# Patient Record
Sex: FEMALE | Race: WHITE | NOT HISPANIC OR LATINO | URBAN - METROPOLITAN AREA
[De-identification: names, ages, dates, MRNs, and addresses within clinical notes are randomized per-mention and may not be internally consistent; named-entity substitution may affect disease eponyms.]

---

## 2023-05-22 ENCOUNTER — TELEPHONE (OUTPATIENT)
Dept: PSYCHIATRY | Facility: CLINIC | Age: 23
End: 2023-05-22

## 2023-05-22 NOTE — TELEPHONE ENCOUNTER
"Behavioral Health Outpatient Intake Questions    Referred By   : self    Please advise interviewee that they need to answer all questions truthfully to allow for best care, and any misrepresentations of information may affect their ability to be seen at this clinic   => Was this discussed? Yes     If Minor Child (under age 25)    Who is/are the legal guardian(s) of the child? Is there a custody agreement? No     • If \"YES\"- Custody orders must be obtained prior to scheduling the first appointment  • In addition, Consent to Treatment must be signed by all legal guardians prior to scheduling the first appointment    • If \"NO\"- Consent to Treatment must be signed by all legal guardians prior to scheduling the first appointment    [address] History -     Presenting Problem (in patient's own words): BPD, PTSD    Are there any communication barriers for this patient? No                                               If yes, please describe barriers: None  • If there is a unique situation, please refer to [facility] for final determination  Are you taking any psychiatric medications? No   •   If \"YES\" -What are they none   •   If \"YES\" -Who prescribes? Has the Patient previously received outpatient Talk Therapy or Medication Management from [place] [address]  No     •    If \"YES\"- When, Where and with Whom? •    If \"NO\" -Has Patient received these services elsewhere? •   If \"YES\" -When, Where, and with Whom? [address] [facility]     Has the Patient abused alcohol or other substances in the last 6 months ? Yes  There are suspicions of methamphetamine abuse reported by the patient  •  If \"YES\" -What substance, How much, How often? 1 time a week, clean for 1 week    •  If illegal substance: [facility] ([facility]) or [place]    •  If Alcohol in excess of 10 drinks per week:  Refer to [facility] (for CIRA) or [address] [facility] [address] History-     Is " "this treatment court ordered? No   If \"yes \"send to :  • Talk Therapy : Send to [facility] for final determination   • Med Management: Send to Dr Niya Guaman for final determination     Has the Patient been convicted of a felony? No   If \"Yes\" send to -When, What? • Talk Therapy : Send to [facility] for final determination   • Med Management: Send to Dr Niya Guaman for final determination     ACCEPTED as a patient Yes  • If \"Yes\" Appointment Date: 5/31/2023 @ 1:00 PM with Judith Perez    Referred Elsewhere? No  • If “Yes” - (Where? Ex: [facility], Psychiatric[facility], [address] [place] [address], etc )       Name of Insurance Co: Monserrat Juarez22 ID# [id number]  [id number] Phone #   If ins is primary or secondary? Primary  If patient is a minor, parents information such as Name, D  O B of guarantor    "

## 2023-05-31 ENCOUNTER — SOCIAL WORK (OUTPATIENT)
Dept: BEHAVIORAL/MENTAL HEALTH CLINIC | Facility: CLINIC | Age: 23
End: 2023-05-31

## 2023-05-31 DIAGNOSIS — F33.2 MAJOR DEPRESSIVE DISORDER, RECURRENT SEVERE WITHOUT PSYCHOTIC FEATURES (HCC): ICD-10-CM

## 2023-05-31 DIAGNOSIS — F60.3 BORDERLINE PERSONALITY DISORDER (HCC): Primary | ICD-10-CM

## 2023-05-31 DIAGNOSIS — F41.1 GAD (GENERALIZED ANXIETY DISORDER): ICD-10-CM
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
## 2023-06-01 ENCOUNTER — TELEPHONE (OUTPATIENT)
Dept: PSYCHIATRY | Facility: CLINIC | Age: 23
End: 2023-06-01

## 2023-06-01 NOTE — TELEPHONE ENCOUNTER
Patient signed SARA for   [facility] [address]  [phone]

## 2023-06-14 ENCOUNTER — SOCIAL WORK (OUTPATIENT)
Dept: BEHAVIORAL/MENTAL HEALTH CLINIC | Facility: CLINIC | Age: 23
End: 2023-06-14
Payer: COMMERCIAL

## 2023-06-14 ENCOUNTER — TELEPHONE (OUTPATIENT)
Dept: BEHAVIORAL/MENTAL HEALTH CLINIC | Facility: CLINIC | Age: 23
End: 2023-06-14

## 2023-06-14 DIAGNOSIS — F60.3 BORDERLINE PERSONALITY DISORDER (HCC): Primary | ICD-10-CM

## 2023-06-14 PROCEDURE — 90834 PSYTX W PT 45 MINUTES: CPT | Performed by: SOCIAL WORKER
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
## 2023-06-14 NOTE — BH TREATMENT PLAN
[facility]  2000     Date of Initial Psychotherapy Assessment: 5-  Date of Current Treatment Plan: 06/14/23  Treatment Plan Target Date: 12-  Treatment Plan Expiration Date: 12-    Diagnosis:   1  Borderline personality disorder ([address] )            Area(s) of Need: Pt reports that she need to learn to act and use her coping skills, she needs to communicate when she needs help, avoid panicking, avoid seeing things from all or nothing thinking  Pt reports that she needs to maintain sobriety, address feeling fatigued, stressed, have longer times that she is stabled and successful in recovery  Long Term Goal 1 (in the client's own words): Pt reports that she needs to continue maintain sobriety and need a stronger support system, structure in her life so that she can be effective in her life with taking care of her son  Stage of Change: Contemplation    Target Date for completion: 12-     Anticipated therapeutic modalities: Pt will look into a partial program for mental health treatment, possible IOP program that will help pt to recovery from addiction and mental health discomfort  Pt will attend meetings 2x a week to stablize herself so that she is productive and healthy to take care of her son  Pt will obtain a peer specialist to support her in her sobriety  People identified to complete this goal: Pt      Objective 1: (identify the means of measuring success in meeting the objective): Pt reports that she needs identify a stronger support system to stabilize herself in recovery  Intervention: Therapist will help pt to figure out if she wants drug and alcohol treatment so that she is more stable and equipped to also address mental health symptoms  Therapist will help pt to see the importance of working on goals that are obtainable and realistic so that she feels better able to handle life on life terms   Therapist and pt will continue to identify coping mechanisms that will aid her in long-term sobriety ongoing  Therapist and pt will continue to build a therapeutic rapport so that she feels adequate to regulate her emotions continuously  Therapist will refer pt to a peer specialist to help pt to maintain a support system that is conducive for her recovery  I am currently under the care of a [facility] psychiatric provider: no    My [facility] psychiatric provider is: N/A    I am currently taking psychiatric medications: No    I feel that I will be ready for discharge from mental health care when I reach the following (measurable goal/objective):   Pt reports that she needs to have a solid understanding of mental health diagnosis, managing trauma therapy  For children and adults who have a legal guardian:   Has there been any change to custody orders and/or guardianship status? NA  If yes, attach updated documentation  I have created my Crisis Plan and have been offered a copy of this plan    [address]: Diagnosis and Treatment Plan explained to Jasmineandrew Jenn acknowledges an understanding of their diagnosis  Jim Gresham agrees to this treatment plan      I have been offered a copy of this Treatment Plan  yes

## 2023-06-28 ENCOUNTER — SOCIAL WORK (OUTPATIENT)
Dept: BEHAVIORAL/MENTAL HEALTH CLINIC | Facility: CLINIC | Age: 23
End: 2023-06-28
Payer: COMMERCIAL

## 2023-06-28 DIAGNOSIS — F60.3 BORDERLINE PERSONALITY DISORDER (HCC): Primary | ICD-10-CM

## 2023-06-28 PROCEDURE — 90832 PSYTX W PT 30 MINUTES: CPT | Performed by: SOCIAL WORKER

## 2023-06-28 NOTE — PSYCH
"Behavioral Health Psychotherapy Progress Note    Psychotherapy Provided: Individual Psychotherapy     1  Borderline personality disorder ([address] )            Goals addressed in session: Goal 1     DATA: Pt came into session with her 11year old son, and shared that she may need more support then bi-weekly sessions because of her inability to maintain sobriety  Pt reported that her P O came to her house yesterday and asked her if she was clean, pt said no and was not able to go to the bathroom  Pt reported that she will be drug tested tomorrow or Friday and will be positive for meth and marijuana  Pt reported that she went to the bathroom and washed off the \"p cup\" and the P O  said she will have re take the p test  Pt reported that she did a IV use for meth, cut down on drinking and smoking marijuana  Pt reported that she is getting connected to a Peer Specialist,  for additonal support  During this session, this clinician used the following therapeutic modalities: Cognitive Behavioral Therapy    Substance Abuse was addressed during this session  If the client is diagnosed with a co-occurring substance use disorder, please indicate any changes in the frequency or amount of use: meth Stage of change for addressing substance use diagnoses: Contemplation stage of change    ASSESSMENT:  Yudi Butler presents with a Euthymic/ normal and Anxious mood  her affect is Normal range and intensity and Blunted, which is congruent, with her mood and the content of the session  The client has not made progress on their goals  Yudi Butler presents with a none risk of suicide, none risk of self-harm, and none risk of harm to others  For any risk assessment that surpasses a \"low\" rating, a safety plan must be developed  A safety plan was indicated: no  If yes, describe in detail     PLAN: Between sessions, Yudi Butler will attend a meeting at [facility] in [facility], [address] [facility] [place]   At the next session, the therapist will use " Solution-Focused Therapy to address urges to use, coping skills to deal with cravings, understand the importance of utilize support system  Therapist asked pt to consider Drug and Alcohol treatment because she is struggling with maintaining sobriety and her P O  reported that she needs more sessions then bi-weekly  Behavioral Health Treatment Plan and Discharge Planning: Lanre Mcclelland is aware of and agrees to continue to work on their treatment plan  They have identified and are working toward their discharge goals   yes    Visit start and stop times:    06/28/23  Start Time: 1419  Stop Time: 3960  Total Visit Time: 38 minutes

## 2023-06-29 ENCOUNTER — TELEPHONE (OUTPATIENT)
Dept: PSYCHIATRY | Facility: CLINIC | Age: 23
End: 2023-06-29

## 2023-10-05 ENCOUNTER — DOCUMENTATION (OUTPATIENT)
Dept: BEHAVIORAL/MENTAL HEALTH CLINIC | Facility: CLINIC | Age: 23
End: 2023-10-05

## 2023-10-05 NOTE — PROGRESS NOTES
Psychotherapy Discharge Summary    Preferred Name: Kendell Diego  YOB: 2000    Admission date to psychotherapy: 05-  Referred by: SELF    Presenting Problem: Boderline Personality Disorder, DYLAN, MDD, recurrent severe without psychotic features. Course of treatment included : individual therapy     Progress/Outcome of Treatment Goals (brief summary of course of treatment) pt was only seen briefly due to struggle with addiction issues. Pt asked to be transferred over to drug and alcohol after a few session with this writer. Treatment Complications (if any): pt appeared to be in the contemplation stage of change, needed more help with drug and alcohol rather than mental health treatment. Treatment Progress: fair    Current [facility] Psychiatric Provider: N/A    Discharge Medications include: N/A    Discharge Date: 10-5-2023    Discharge Diagnosis: F41.1 F60.3 F33.2  Criteria for Discharge: need to be transferred to another service/level of care within the system    Aftercare recommendations include (include specific referral names and phone numbers, if appropriate): PT was referred to drug and alcohol treatment at [facility].     Prognosis: fair

## 2024-09-08 ENCOUNTER — APPOINTMENT (EMERGENCY)
Dept: ULTRASOUND IMAGING | Facility: HOSPITAL | Age: 24
End: 2024-09-08
Payer: COMMERCIAL

## 2024-09-08 ENCOUNTER — ANESTHESIA EVENT (EMERGENCY)
Dept: PERIOP | Facility: HOSPITAL | Age: 24
End: 2024-09-08
Payer: COMMERCIAL

## 2024-09-08 ENCOUNTER — ANESTHESIA (EMERGENCY)
Dept: PERIOP | Facility: HOSPITAL | Age: 24
End: 2024-09-08
Payer: COMMERCIAL

## 2024-09-08 ENCOUNTER — HOSPITAL ENCOUNTER (OUTPATIENT)
Facility: HOSPITAL | Age: 24
Setting detail: OUTPATIENT SURGERY
Discharge: HOME | End: 2024-09-09
Attending: EMERGENCY MEDICINE | Admitting: EMERGENCY MEDICINE
Payer: COMMERCIAL

## 2024-09-08 VITALS
BODY MASS INDEX: 29.99 KG/M2 | SYSTOLIC BLOOD PRESSURE: 105 MMHG | OXYGEN SATURATION: 97 % | HEIGHT: 65 IN | RESPIRATION RATE: 18 BRPM | DIASTOLIC BLOOD PRESSURE: 53 MMHG | WEIGHT: 180 LBS | TEMPERATURE: 97.4 F | HEART RATE: 67 BPM

## 2024-09-08 DIAGNOSIS — N93.9 VAGINAL HEMORRHAGE: Primary | ICD-10-CM

## 2024-09-08 DIAGNOSIS — O03.9 ABORTION: ICD-10-CM

## 2024-09-08 DIAGNOSIS — O03.6: ICD-10-CM

## 2024-09-08 LAB
ABO GROUP BLD: NORMAL
ABO GROUP BLD: NORMAL
ALBUMIN SERPL BCG-MCNC: 3.7 G/DL (ref 3.5–5)
ALP SERPL-CCNC: 61 U/L (ref 34–104)
ALT SERPL W P-5'-P-CCNC: 6 U/L (ref 7–52)
ANION GAP SERPL CALCULATED.3IONS-SCNC: 7 MMOL/L (ref 4–13)
APTT PPP: 29 SECONDS (ref 23–34)
AST SERPL W P-5'-P-CCNC: 13 U/L (ref 13–39)
B-HCG SERPL-ACNC: 7005.3 MIU/ML (ref 0–5)
BASOPHILS # BLD AUTO: 0.07 THOUSANDS/ÂΜL (ref 0–0.1)
BASOPHILS NFR BLD AUTO: 1 % (ref 0–1)
BILIRUB SERPL-MCNC: 0.23 MG/DL (ref 0.2–1)
BLD GP AB SCN SERPL QL: NEGATIVE
BUN SERPL-MCNC: 11 MG/DL (ref 5–25)
CALCIUM SERPL-MCNC: 8.6 MG/DL (ref 8.4–10.2)
CHLORIDE SERPL-SCNC: 106 MMOL/L (ref 96–108)
CO2 SERPL-SCNC: 24 MMOL/L (ref 21–32)
CREAT SERPL-MCNC: 0.56 MG/DL (ref 0.6–1.3)
EOSINOPHIL # BLD AUTO: 0.24 THOUSAND/ÂΜL (ref 0–0.61)
EOSINOPHIL NFR BLD AUTO: 2 % (ref 0–6)
ERYTHROCYTE [DISTWIDTH] IN BLOOD BY AUTOMATED COUNT: 14.4 % (ref 11.6–15.1)
ERYTHROCYTE [DISTWIDTH] IN BLOOD BY AUTOMATED COUNT: 14.6 % (ref 11.6–15.1)
FIBRINOGEN PPP-MCNC: 538 MG/DL (ref 206–523)
GFR SERPL CREATININE-BSD FRML MDRD: 129 ML/MIN/1.73SQ M
GLUCOSE SERPL-MCNC: 99 MG/DL (ref 65–140)
HCT VFR BLD AUTO: 32.4 % (ref 34.8–46.1)
HCT VFR BLD AUTO: 34.5 % (ref 34.8–46.1)
HGB BLD-MCNC: 10.7 G/DL (ref 11.5–15.4)
HGB BLD-MCNC: 11.4 G/DL (ref 11.5–15.4)
IMM GRANULOCYTES # BLD AUTO: 0.11 THOUSAND/UL (ref 0–0.2)
IMM GRANULOCYTES NFR BLD AUTO: 1 % (ref 0–2)
INR PPP: 1.08 (ref 0.85–1.19)
LYMPHOCYTES # BLD AUTO: 2.21 THOUSANDS/ÂΜL (ref 0.6–4.47)
LYMPHOCYTES NFR BLD AUTO: 15 % (ref 14–44)
MCH RBC QN AUTO: 28.7 PG (ref 26.8–34.3)
MCH RBC QN AUTO: 29.2 PG (ref 26.8–34.3)
MCHC RBC AUTO-ENTMCNC: 33 G/DL (ref 31.4–37.4)
MCHC RBC AUTO-ENTMCNC: 33 G/DL (ref 31.4–37.4)
MCV RBC AUTO: 87 FL (ref 82–98)
MCV RBC AUTO: 88 FL (ref 82–98)
MONOCYTES # BLD AUTO: 1.25 THOUSAND/ÂΜL (ref 0.17–1.22)
MONOCYTES NFR BLD AUTO: 9 % (ref 4–12)
NEUTROPHILS # BLD AUTO: 10.63 THOUSANDS/ÂΜL (ref 1.85–7.62)
NEUTS SEG NFR BLD AUTO: 72 % (ref 43–75)
NRBC BLD AUTO-RTO: 0 /100 WBCS
PLATELET # BLD AUTO: 259 THOUSANDS/UL (ref 149–390)
PLATELET # BLD AUTO: 281 THOUSANDS/UL (ref 149–390)
PMV BLD AUTO: 10.6 FL (ref 8.9–12.7)
PMV BLD AUTO: 10.6 FL (ref 8.9–12.7)
POTASSIUM SERPL-SCNC: 4 MMOL/L (ref 3.5–5.3)
PROT SERPL-MCNC: 6.3 G/DL (ref 6.4–8.4)
PROTHROMBIN TIME: 14.5 SECONDS (ref 12.3–15)
RBC # BLD AUTO: 3.67 MILLION/UL (ref 3.81–5.12)
RBC # BLD AUTO: 3.97 MILLION/UL (ref 3.81–5.12)
RH BLD: POSITIVE
RH BLD: POSITIVE
SODIUM SERPL-SCNC: 137 MMOL/L (ref 135–147)
SPECIMEN EXPIRATION DATE: NORMAL
WBC # BLD AUTO: 14.51 THOUSAND/UL (ref 4.31–10.16)
WBC # BLD AUTO: 21.22 THOUSAND/UL (ref 4.31–10.16)

## 2024-09-08 PROCEDURE — 85384 FIBRINOGEN ACTIVITY: CPT | Performed by: OBSTETRICS & GYNECOLOGY

## 2024-09-08 PROCEDURE — 76856 US EXAM PELVIC COMPLETE: CPT

## 2024-09-08 PROCEDURE — 76830 TRANSVAGINAL US NON-OB: CPT

## 2024-09-08 PROCEDURE — 86850 RBC ANTIBODY SCREEN: CPT | Performed by: EMERGENCY MEDICINE

## 2024-09-08 PROCEDURE — 85027 COMPLETE CBC AUTOMATED: CPT | Performed by: OBSTETRICS & GYNECOLOGY

## 2024-09-08 PROCEDURE — NC001 PR NO CHARGE: Performed by: OBSTETRICS & GYNECOLOGY

## 2024-09-08 PROCEDURE — 86920 COMPATIBILITY TEST SPIN: CPT

## 2024-09-08 PROCEDURE — 86901 BLOOD TYPING SEROLOGIC RH(D): CPT | Performed by: EMERGENCY MEDICINE

## 2024-09-08 PROCEDURE — 96372 THER/PROPH/DIAG INJ SC/IM: CPT

## 2024-09-08 PROCEDURE — 85730 THROMBOPLASTIN TIME PARTIAL: CPT | Performed by: OBSTETRICS & GYNECOLOGY

## 2024-09-08 PROCEDURE — 99284 EMERGENCY DEPT VISIT MOD MDM: CPT

## 2024-09-08 PROCEDURE — 36415 COLL VENOUS BLD VENIPUNCTURE: CPT | Performed by: EMERGENCY MEDICINE

## 2024-09-08 PROCEDURE — 84702 CHORIONIC GONADOTROPIN TEST: CPT | Performed by: EMERGENCY MEDICINE

## 2024-09-08 PROCEDURE — P9016 RBC LEUKOCYTES REDUCED: HCPCS

## 2024-09-08 PROCEDURE — 96374 THER/PROPH/DIAG INJ IV PUSH: CPT

## 2024-09-08 PROCEDURE — 85610 PROTHROMBIN TIME: CPT | Performed by: OBSTETRICS & GYNECOLOGY

## 2024-09-08 PROCEDURE — 36430 TRANSFUSION BLD/BLD COMPNT: CPT

## 2024-09-08 PROCEDURE — 88305 TISSUE EXAM BY PATHOLOGIST: CPT | Performed by: PATHOLOGY

## 2024-09-08 PROCEDURE — 80053 COMPREHEN METABOLIC PANEL: CPT | Performed by: EMERGENCY MEDICINE

## 2024-09-08 PROCEDURE — 96361 HYDRATE IV INFUSION ADD-ON: CPT

## 2024-09-08 PROCEDURE — 99291 CRITICAL CARE FIRST HOUR: CPT | Performed by: EMERGENCY MEDICINE

## 2024-09-08 PROCEDURE — 96375 TX/PRO/DX INJ NEW DRUG ADDON: CPT

## 2024-09-08 PROCEDURE — 86900 BLOOD TYPING SEROLOGIC ABO: CPT | Performed by: EMERGENCY MEDICINE

## 2024-09-08 PROCEDURE — 85025 COMPLETE CBC W/AUTO DIFF WBC: CPT | Performed by: EMERGENCY MEDICINE

## 2024-09-08 RX ORDER — ONDANSETRON 2 MG/ML
4 INJECTION INTRAMUSCULAR; INTRAVENOUS ONCE
Status: COMPLETED | OUTPATIENT
Start: 2024-09-08 | End: 2024-09-08

## 2024-09-08 RX ORDER — DEXAMETHASONE SODIUM PHOSPHATE 10 MG/ML
INJECTION, SOLUTION INTRAMUSCULAR; INTRAVENOUS AS NEEDED
Status: DISCONTINUED | OUTPATIENT
Start: 2024-09-08 | End: 2024-09-08

## 2024-09-08 RX ORDER — HYDROMORPHONE HCL/PF 1 MG/ML
0.4 SYRINGE (ML) INJECTION
Status: DISCONTINUED | OUTPATIENT
Start: 2024-09-08 | End: 2024-09-08 | Stop reason: HOSPADM

## 2024-09-08 RX ORDER — FENTANYL CITRATE/PF 50 MCG/ML
25 SYRINGE (ML) INJECTION
Status: DISCONTINUED | OUTPATIENT
Start: 2024-09-08 | End: 2024-09-08 | Stop reason: HOSPADM

## 2024-09-08 RX ORDER — MISOPROSTOL 200 UG/1
1000 TABLET ORAL ONCE
Status: COMPLETED | OUTPATIENT
Start: 2024-09-08 | End: 2024-09-08

## 2024-09-08 RX ORDER — KETOROLAC TROMETHAMINE 30 MG/ML
INJECTION, SOLUTION INTRAMUSCULAR; INTRAVENOUS AS NEEDED
Status: DISCONTINUED | OUTPATIENT
Start: 2024-09-08 | End: 2024-09-08

## 2024-09-08 RX ORDER — ACETAMINOPHEN 325 MG/1
650 TABLET ORAL EVERY 4 HOURS PRN
Status: DISCONTINUED | OUTPATIENT
Start: 2024-09-08 | End: 2024-09-09 | Stop reason: HOSPADM

## 2024-09-08 RX ORDER — SODIUM CHLORIDE, SODIUM LACTATE, POTASSIUM CHLORIDE, CALCIUM CHLORIDE 600; 310; 30; 20 MG/100ML; MG/100ML; MG/100ML; MG/100ML
100 INJECTION, SOLUTION INTRAVENOUS CONTINUOUS
Status: DISCONTINUED | OUTPATIENT
Start: 2024-09-08 | End: 2024-09-09

## 2024-09-08 RX ORDER — KETOROLAC TROMETHAMINE 30 MG/ML
30 INJECTION, SOLUTION INTRAMUSCULAR; INTRAVENOUS EVERY 6 HOURS PRN
Status: DISCONTINUED | OUTPATIENT
Start: 2024-09-08 | End: 2024-09-09 | Stop reason: HOSPADM

## 2024-09-08 RX ORDER — DOXYCYCLINE 100 MG/10ML
200 INJECTION, POWDER, LYOPHILIZED, FOR SOLUTION INTRAVENOUS
Status: DISCONTINUED | OUTPATIENT
Start: 2024-09-08 | End: 2024-09-08 | Stop reason: SDUPTHER

## 2024-09-08 RX ORDER — SUCCINYLCHOLINE/SOD CL,ISO/PF 100 MG/5ML
SYRINGE (ML) INTRAVENOUS AS NEEDED
Status: DISCONTINUED | OUTPATIENT
Start: 2024-09-08 | End: 2024-09-08

## 2024-09-08 RX ORDER — TRANEXAMIC ACID 10 MG/ML
1000 INJECTION, SOLUTION INTRAVENOUS ONCE
Status: COMPLETED | OUTPATIENT
Start: 2024-09-08 | End: 2024-09-08

## 2024-09-08 RX ORDER — ONDANSETRON 2 MG/ML
4 INJECTION INTRAMUSCULAR; INTRAVENOUS ONCE AS NEEDED
Status: DISCONTINUED | OUTPATIENT
Start: 2024-09-08 | End: 2024-09-08 | Stop reason: HOSPADM

## 2024-09-08 RX ORDER — LIDOCAINE HYDROCHLORIDE 10 MG/ML
INJECTION, SOLUTION EPIDURAL; INFILTRATION; INTRACAUDAL; PERINEURAL AS NEEDED
Status: DISCONTINUED | OUTPATIENT
Start: 2024-09-08 | End: 2024-09-08

## 2024-09-08 RX ORDER — KETOROLAC TROMETHAMINE 30 MG/ML
15 INJECTION, SOLUTION INTRAMUSCULAR; INTRAVENOUS ONCE
Status: COMPLETED | OUTPATIENT
Start: 2024-09-08 | End: 2024-09-08

## 2024-09-08 RX ORDER — FENTANYL CITRATE 50 UG/ML
INJECTION, SOLUTION INTRAMUSCULAR; INTRAVENOUS AS NEEDED
Status: DISCONTINUED | OUTPATIENT
Start: 2024-09-08 | End: 2024-09-08

## 2024-09-08 RX ORDER — METHYLERGONOVINE MALEATE 0.2 MG/ML
0.2 INJECTION INTRAVENOUS ONCE
Status: COMPLETED | OUTPATIENT
Start: 2024-09-08 | End: 2024-09-08

## 2024-09-08 RX ORDER — SODIUM CHLORIDE, SODIUM LACTATE, POTASSIUM CHLORIDE, CALCIUM CHLORIDE 600; 310; 30; 20 MG/100ML; MG/100ML; MG/100ML; MG/100ML
INJECTION, SOLUTION INTRAVENOUS CONTINUOUS PRN
Status: DISCONTINUED | OUTPATIENT
Start: 2024-09-08 | End: 2024-09-08

## 2024-09-08 RX ORDER — MISOPROSTOL 200 UG/1
1000 TABLET ORAL ONCE
Status: DISCONTINUED | OUTPATIENT
Start: 2024-09-08 | End: 2024-09-08

## 2024-09-08 RX ORDER — LIDOCAINE HYDROCHLORIDE 10 MG/ML
INJECTION, SOLUTION EPIDURAL; INFILTRATION; INTRACAUDAL; PERINEURAL AS NEEDED
Status: DISCONTINUED | OUTPATIENT
Start: 2024-09-08 | End: 2024-09-08 | Stop reason: HOSPADM

## 2024-09-08 RX ORDER — MIDAZOLAM HYDROCHLORIDE 2 MG/2ML
INJECTION, SOLUTION INTRAMUSCULAR; INTRAVENOUS AS NEEDED
Status: DISCONTINUED | OUTPATIENT
Start: 2024-09-08 | End: 2024-09-08

## 2024-09-08 RX ORDER — ONDANSETRON 2 MG/ML
INJECTION INTRAMUSCULAR; INTRAVENOUS AS NEEDED
Status: DISCONTINUED | OUTPATIENT
Start: 2024-09-08 | End: 2024-09-08

## 2024-09-08 RX ORDER — PROPOFOL 10 MG/ML
INJECTION, EMULSION INTRAVENOUS AS NEEDED
Status: DISCONTINUED | OUTPATIENT
Start: 2024-09-08 | End: 2024-09-08

## 2024-09-08 RX ORDER — MORPHINE SULFATE 4 MG/ML
4 INJECTION, SOLUTION INTRAMUSCULAR; INTRAVENOUS ONCE
Status: DISCONTINUED | OUTPATIENT
Start: 2024-09-08 | End: 2024-09-09 | Stop reason: HOSPADM

## 2024-09-08 RX ADMIN — SODIUM CHLORIDE, SODIUM LACTATE, POTASSIUM CHLORIDE, AND CALCIUM CHLORIDE: .6; .31; .03; .02 INJECTION, SOLUTION INTRAVENOUS at 12:46

## 2024-09-08 RX ADMIN — SODIUM CHLORIDE 500 ML: 0.9 INJECTION, SOLUTION INTRAVENOUS at 10:04

## 2024-09-08 RX ADMIN — FENTANYL CITRATE 100 MCG: 50 INJECTION, SOLUTION INTRAMUSCULAR; INTRAVENOUS at 12:53

## 2024-09-08 RX ADMIN — ONDANSETRON 4 MG: 2 INJECTION INTRAMUSCULAR; INTRAVENOUS at 11:15

## 2024-09-08 RX ADMIN — LIDOCAINE HYDROCHLORIDE 50 MG: 10 INJECTION, SOLUTION EPIDURAL; INFILTRATION; INTRACAUDAL; PERINEURAL at 12:55

## 2024-09-08 RX ADMIN — DEXAMETHASONE SODIUM PHOSPHATE 10 MG: 10 INJECTION, SOLUTION INTRAMUSCULAR; INTRAVENOUS at 13:02

## 2024-09-08 RX ADMIN — PROPOFOL 200 MG: 10 INJECTION, EMULSION INTRAVENOUS at 12:55

## 2024-09-08 RX ADMIN — DOXYCYCLINE 200 MG: 100 INJECTION, POWDER, LYOPHILIZED, FOR SOLUTION INTRAVENOUS at 12:44

## 2024-09-08 RX ADMIN — METHYLERGONOVINE MALEATE 0.2 MG: 0.2 INJECTION INTRAVENOUS at 11:14

## 2024-09-08 RX ADMIN — MISOPROSTOL 1000 MCG: 200 TABLET ORAL at 11:40

## 2024-09-08 RX ADMIN — PROPOFOL 70 MG: 10 INJECTION, EMULSION INTRAVENOUS at 13:23

## 2024-09-08 RX ADMIN — ONDANSETRON 4 MG: 2 INJECTION INTRAMUSCULAR; INTRAVENOUS at 13:12

## 2024-09-08 RX ADMIN — SODIUM CHLORIDE 500 ML: 0.9 INJECTION, SOLUTION INTRAVENOUS at 10:50

## 2024-09-08 RX ADMIN — MIDAZOLAM 2 MG: 1 INJECTION INTRAMUSCULAR; INTRAVENOUS at 12:46

## 2024-09-08 RX ADMIN — Medication 80 MG: at 12:55

## 2024-09-08 RX ADMIN — KETOROLAC TROMETHAMINE 15 MG: 30 INJECTION, SOLUTION INTRAMUSCULAR; INTRAVENOUS at 10:28

## 2024-09-08 RX ADMIN — KETOROLAC TROMETHAMINE 30 MG: 30 INJECTION, SOLUTION INTRAMUSCULAR; INTRAVENOUS at 18:14

## 2024-09-08 RX ADMIN — TRANEXAMIC ACID 1000 MG: 10 INJECTION, SOLUTION INTRAVENOUS at 11:33

## 2024-09-08 RX ADMIN — SODIUM CHLORIDE, SODIUM LACTATE, POTASSIUM CHLORIDE, AND CALCIUM CHLORIDE 100 ML/HR: .6; .31; .03; .02 INJECTION, SOLUTION INTRAVENOUS at 15:53

## 2024-09-08 RX ADMIN — HYDROMORPHONE HYDROCHLORIDE 0.4 MG: 1 INJECTION, SOLUTION INTRAMUSCULAR; INTRAVENOUS; SUBCUTANEOUS at 14:12

## 2024-09-08 RX ADMIN — KETOROLAC TROMETHAMINE 15 MG: 30 INJECTION, SOLUTION INTRAMUSCULAR; INTRAVENOUS at 13:21

## 2024-09-08 NOTE — ANESTHESIA POSTPROCEDURE EVALUATION
Post-Op Assessment Note    CV Status:  Stable  Pain Score: 0    Pain management: adequate       Mental Status:  Awake and sleepy   Hydration Status:  Euvolemic   PONV Controlled:  Controlled   Airway Patency:  Patent  Airway: intubated     Post Op Vitals Reviewed: Yes    No anethesia notable event occurred.    Staff: CRNA               BP   112/62   Temp      Pulse  67   Resp      SpO2   100 4LNC

## 2024-09-08 NOTE — ED PROVIDER NOTES
History  Chief Complaint   Patient presents with    Vaginal Bleeding     C/o heavy vaginal bleeding. Was given  pill on 8/15. Monday was reeval and told incomplete , was given another  pill, approx 9 weeks pregnant at time. Has had intermittent bleeding/cramping since. Was seen at [facility] yest. 0900 today passed large blood clots and has heavy bleeding since. GCS 15 during triage     Patient is a 24-year-old female currently at approximately 9 weeks gestation who received medical  pills on 8/15 and then vaginal  pills last week who presents with heavy vaginal bleeding.  Patient states that for the last week she has been having heavy vaginal bleeding, but today at around 9 AM she started with lemon to orange sized clots with vaginal bleeding that she described as gushing around that. She states that she had a significant amount of cramping at the time of the largest clots, but that the cramping is continuing and significant. States that she is getting a bit lightheaded.        None       Past Medical History:   Diagnosis Date            History reviewed. No pertinent surgical history.    History reviewed. No pertinent family history.  I have reviewed and agree with the history as documented.    E-Cigarette/Vaping     E-Cigarette/Vaping Substances    Nicotine Yes     THC No     CBD No     Flavoring No     Other No     Unknown No      Social History     Tobacco Use    Smoking status: Never    Smokeless tobacco: Never   Substance Use Topics    Alcohol use: Not Currently     Comment: denies x 10 days    Drug use: Not Currently     Types: Methamphetamines     Comment: hx of meth use       Review of Systems   Constitutional:  Negative for chills and fever.   Respiratory:  Negative for shortness of breath.    Cardiovascular:  Negative for chest pain.   Gastrointestinal:  Negative for abdominal pain, nausea and vomiting.   Genitourinary:  Positive for pelvic pain and vaginal  bleeding. Negative for dysuria, flank pain, hematuria, vaginal discharge and vaginal pain.   Musculoskeletal:  Negative for back pain.   Skin:  Positive for pallor.   Neurological:  Positive for light-headedness. Negative for dizziness and syncope.       Physical Exam  Physical Exam  Vitals and nursing note reviewed.   Constitutional:       General: She is not in acute distress.     Appearance: Normal appearance. She is not ill-appearing, toxic-appearing or diaphoretic.   HENT:      Head: Normocephalic and atraumatic.      Mouth/Throat:      Mouth: Mucous membranes are moist.   Eyes:      Conjunctiva/sclera: Conjunctivae normal.      Pupils: Pupils are equal, round, and reactive to light.   Cardiovascular:      Rate and Rhythm: Regular rhythm. Tachycardia present.      Pulses: Normal pulses.      Heart sounds: Normal heart sounds. No murmur heard.  Pulmonary:      Effort: Pulmonary effort is normal. No respiratory distress.      Breath sounds: Normal breath sounds. No stridor. No wheezing, rhonchi or rales.   Chest:      Chest wall: No tenderness.   Abdominal:      General: Bowel sounds are normal. There is no distension.      Palpations: Abdomen is soft.      Tenderness: There is abdominal tenderness in the suprapubic area. There is no right CVA tenderness, left CVA tenderness, guarding or rebound. Negative signs include De La Rosa's sign, Rovsing's sign, McBurney's sign and psoas sign.   Genitourinary:     Comments: Approx 250mL of blood and clots changed on devika on bed. Pelvic exam deferred as OB is coming down for assessment. All equipment at bedside.   Skin:     General: Skin is warm and dry.      Coloration: Skin is pale.   Neurological:      General: No focal deficit present.      Mental Status: She is alert and oriented to person, place, and time. Mental status is at baseline.         Vital Signs  ED Triage Vitals [09/08/24 0959]   Temperature Pulse Respirations Blood Pressure SpO2   98 °F (36.7 °C) 73 18  118/76 99 %      Temp Source Heart Rate Source Patient Position - Orthostatic VS BP Location FiO2 (%)   Temporal Monitor Lying Right arm --      Pain Score       6           Vitals:    09/08/24 1059 09/08/24 1101 09/08/24 1116 09/08/24 1130   BP: 111/55 111/55 106/64 114/63   Pulse:  80 71 67   Patient Position - Orthostatic VS:  Lying Sitting          Visual Acuity      ED Medications  Medications   morphine injection 4 mg (4 mg Intravenous Not Given 9/8/24 1143)   sodium chloride 0.9 % bolus 500 mL (0 mL Intravenous Stopped 9/8/24 1050)   ketorolac (TORADOL) injection 15 mg (15 mg Intravenous Given 9/8/24 1028)   sodium chloride 0.9 % bolus 500 mL (0 mL Intravenous Stopped 9/8/24 1128)   methylergonovine (METHERGINE) injection 0.2 mg (0.2 mg Intramuscular Given 9/8/24 1114)   ondansetron (ZOFRAN) injection 4 mg (4 mg Intravenous Given 9/8/24 1115)   tranexamic acid (CYKLOKAPRON) 1000-0.7 MG/100ML-% injection 1,000 mg (1,000 mg Intravenous New Bag 9/8/24 1133)   miSOPROStol (Cytotec) tablet 1,000 mcg (1,000 mcg Oral Given 9/8/24 1140)       Diagnostic Studies  Results Reviewed       Procedure Component Value Units Date/Time    Quantitative hCG [[id number]]  (Abnormal) Collected: 09/08/24 1001    Lab Status: Final result Specimen: Blood from Arm, Left Updated: 09/08/24 1118     HCG, Quant 7,005.3 mIU/mL     Narrative:       Expected Ranges:    HCG results between 5.0 and 25.0 mIU/mL may be indicative of early pregnancy but should be interpreted in light of the total clinical presentation.    HCG can rise to detectable levels in lori and post menopausal women (0-11.6 mIU/mL).     Approximate               Approximate HCG  Gestation age          Concentration ( mIU/mL)  _____________          ______________________   Weeks                      HCG values  0.2-1                       5-50  1-2                           2-3                         100-5000  3-4                         500-42761  4-5                          1000-28230  5-6                         [id number]  6-8                         [id number]  8-12                        [id number]      Comprehensive metabolic panel [[id number]]  (Abnormal) Collected: 09/08/24 1001    Lab Status: Final result Specimen: Blood from Arm, Left Updated: 09/08/24 1041     Sodium 137 mmol/L      Potassium 4.0 mmol/L      Chloride 106 mmol/L      CO2 24 mmol/L      ANION GAP 7 mmol/L      BUN 11 mg/dL      Creatinine 0.56 mg/dL      Glucose 99 mg/dL      Calcium 8.6 mg/dL      AST 13 U/L      ALT 6 U/L      Alkaline Phosphatase 61 U/L      Total Protein 6.3 g/dL      Albumin 3.7 g/dL      Total Bilirubin 0.23 mg/dL      eGFR 129 ml/min/1.73sq m     Narrative:      National Kidney Disease Foundation guidelines for Chronic Kidney Disease (CKD):     Stage 1 with normal or high GFR (GFR > 90 mL/min/1.73 square meters)    Stage 2 Mild CKD (GFR = 60-89 mL/min/1.73 square meters)    Stage 3A Moderate CKD (GFR = 45-59 mL/min/1.73 square meters)    Stage 3B Moderate CKD (GFR = 30-44 mL/min/1.73 square meters)    Stage 4 Severe CKD (GFR = 15-29 mL/min/1.73 square meters)    Stage 5 End Stage CKD (GFR <15 mL/min/1.73 square meters)  Note: GFR calculation is accurate only with a steady state creatinine    CBC and differential [[id number]]  (Abnormal) Collected: 09/08/24 1001    Lab Status: Final result Specimen: Blood from Arm, Left Updated: 09/08/24 1021     WBC 14.51 Thousand/uL      RBC 3.97 Million/uL      Hemoglobin 11.4 g/dL      Hematocrit 34.5 %      MCV 87 fL      MCH 28.7 pg      MCHC 33.0 g/dL      RDW 14.4 %      MPV 10.6 fL      Platelets 281 Thousands/uL      nRBC 0 /100 WBCs      Segmented % 72 %      Immature Grans % 1 %      Lymphocytes % 15 %      Monocytes % 9 %      Eosinophils Relative 2 %      Basophils Relative 1 %      Absolute Neutrophils 10.63 Thousands/µL      Absolute Immature Grans 0.11 Thousand/uL      Absolute Lymphocytes 2.21 Thousands/µL      Absolute  Monocytes 1.25 Thousand/µL      Eosinophils Absolute 0.24 Thousand/µL      Basophils Absolute 0.07 Thousands/µL     UA w Reflex to Microscopic w Reflex to Culture [[id number]]     Lab Status: No result Specimen: Urine                    US pelvis complete w transvaginal    (Results Pending)              Procedures  CriticalCare Time    Date/Time: 9/8/2024 10:48 AM    Performed by: Staci Yepez DO  Authorized by: Staci Yepez DO    Critical care provider statement:     Critical care time (minutes):  50    Critical care time was exclusive of:  Separately billable procedures and treating other patients and teaching time    Critical care was necessary to treat or prevent imminent or life-threatening deterioration of the following conditions: vaginal hemorrhage.    Critical care was time spent personally by me on the following activities:  Blood draw for specimens, obtaining history from patient or surrogate, development of treatment plan with patient or surrogate, discussions with consultants, evaluation of patient's response to treatment, examination of patient, review of old charts, re-evaluation of patient's condition, ordering and review of radiographic studies, ordering and review of laboratory studies and ordering and performing treatments and interventions    I assumed direction of critical care for this patient from another provider in my specialty: no             ED Course  ED Course as of 09/08/24 1146   Sun Sep 08, 2024   1013 Texted OB- Dr. Yang for evaluation.    1038 Dr. Yang evaluated patient. Due to amount of bleeding, will be taking her to OR for evaluation and evacuation.   1041 Blood bank to make them aware of the order for 2 units of PRBCs to be on hold for preop   1056 Patient's blood pressure dropped to the 70s. Called blood bank for 1 U of uncross matched blood stat. OB at bedside as well   1146 Patient getting the unit of uncrossmatched blood at this time. Currently remaining with  SBP > 100                                 SBIRT 22yo+      Flowsheet Row Most Recent Value   Initial Alcohol Screen: US AUDIT-C     1. How often do you have a drink containing alcohol? 1 Filed at: 2024 1000   2. How many drinks containing alcohol do you have on a typical day you are drinking?  0 Filed at: 2024 1000   3b. FEMALE Any Age, or MALE 65+: How often do you have 4 or more drinks on one occassion? 0 Filed at: 2024 1000   Audit-C Score 1 Filed at: 2024 1000   WALTER: How many times in the past year have you...    Used an illegal drug or used a prescription medication for non-medical reasons? Never Filed at: 2024 1000                      Medical Decision Making  Assessment and plan:  Vaginal hemorrhage in setting of ongoing . Will start with IVF resuscitation, type and cross for 2 units, check hgb and call OBGYN for evaluation.     Amount and/or Complexity of Data Reviewed  Labs: ordered.  Radiology: ordered.    Risk  Prescription drug management.  Decision regarding hospitalization.                 Disposition  Final diagnoses:   Vaginal hemorrhage        Time reflects when diagnosis was documented in both MDM as applicable and the Disposition within this note       Time User Action Codes Description Comment    2024 10:38 AM Staci Yepez [N93.9] Vaginal hemorrhage     2024 10:38 AM Staci Yepez [O03.9]            ED Disposition       ED Disposition   Send to OR    Condition   --    Date/Time   Sun Sep 8, 2024 1038    Comment   --             Follow-up Information    None         Patient's Medications    No medications on file       No discharge procedures on file.    PDMP Review       None            ED Provider  Electronically Signed by             Staci Yepez DO  24 1051       Staci Yepez DO  24 1136       Staci Yepez DO  24 1146

## 2024-09-08 NOTE — OP NOTE
OPERATIVE REPORT  PATIENT NAME: Rachel Rice    :  2000  MRN: [id number]  Pt Location: [facility] OR ROOM 02    SURGERY DATE: 2024    Surgeons and Role:     * Tracy Yang MD - Primary    Preop Diagnosis:   complicated with hemorrhage [O03.6]    Post-Op Diagnosis Codes:     *  complicated with hemorrhage [O03.6]    Procedure(s) (LRB):  DILATATION AND EVACUATION (D&E) (# OF WEEKS) (N/A)    Specimen(s):  ID Type Source Tests Collected by Time Destination   1 : POC Tissue Products of Conception TISSUE EXAM Tracy Yang MD 2024 1306        Surgical QBL:  No data recorded    Anesthesia Type:   General    Operative Indications:   complicated with hemorrhage [O03.6]     Operative Findings:      Complications:   None    Procedure and Technique:    Ms. Rice was taken to the operating room and placed on the operating table in a supine position. After adequate anesthesia, the patient was placed in the dorsal lithotomy position. The vagina was prepped. The patient was then draped. A  weighted speculum and jace retractor were then placed in the vagina to visualize the cervix.  The anterior lip of the cervix was then grasped with a tenaculum. The cervix was dilated to 29 Cypriot. Using an 10-mm suction curette, multiple curettages were performed removing tissue which appeared appropriate in quantity and consistency with an 9-week pregnancy. A sharp curettage then was performed, followed by two repeat suction curettages. Once no further tissue could be removed, the procedure was terminated. There was excellent hemostasis. The equipment was removed from the vagina. Bedside ultrasound was performed and revealed empty uterus.The cervix was carefully inspected once last time to ensure hemostasis from the the tenaculum site and that there was no significant hemorrhage from the os and then the weighted speculum was removed. The patient tolerated the procedure well. The patient's blood type is Rh +.    I  was present for the entire procedure.    Patient Disposition:  PACU       SIGNATURE: Tracy Yang MD  DATE: September 8, 2024  TIME: 1:35 PM

## 2024-09-08 NOTE — QUICK NOTE
Came to evaluate patient. She reports a gush of blood right after surgery but minimal bleeding at this point. Toradol helping with cramping/pain.   Reviewed operative findings of POCs seen on evacuation.   Will monitor overnight for bleeding.  If bleeding stable, plan for discharge in am.

## 2024-09-08 NOTE — ANESTHESIA PREPROCEDURE EVALUATION
Procedure:  DILATATION AND EVACUATION (D&E) (# OF WEEKS) (Uterus)    Relevant Problems   ANESTHESIA  None prior, no known fam hx      CARDIO (within normal limits)      GI/HEPATIC  No vomiting, + nausea - received zofran with improvement      NEURO/PSYCH  Hx meth use, THC, smoking      PULMONARY   (-) URI (upper respiratory infection)      Obstetrics/Gynecology   (+)  complicated with hemorrhage      Physical Exam    Airway    Mallampati score: I  TM Distance: >3 FB  Neck ROM: full     Dental   Comment: Oral piercings removed, No notable dental hx     Cardiovascular      Pulmonary      Other Findings  post-pubertal.    Lab Results   Component Value Date    WBC 14.51 (H) 2024    HGB 11.4 (L) 2024     2024     Lab Results   Component Value Date    SODIUM 137 2024    K 4.0 2024    BUN 11 2024    CREATININE 0.56 (L) 2024    EGFR 129 2024     Blood type O+/antibody neg.  Received 1 unit emergency release uncrossmatched blood - 2 units crossmatch pending    Received toradol @ 1030, zofran @ 1115  Methergine  Cytotec  TXA    Anesthesia Plan  ASA Score- 2 Emergent    Anesthesia Type- general with ASA Monitors.         Additional Monitors:     Airway Plan: ETT.           Plan Factors-Exercise tolerance (METS): >4 METS.    Chart reviewed.   Existing labs reviewed. Patient summary reviewed.                  Induction- intravenous.    Postoperative Plan-         Informed Consent- Anesthetic plan and risks discussed with patient and spouse.  I personally reviewed this patient with the CRNA. Discussed and agreed on the Anesthesia Plan with the CRNA..

## 2024-09-08 NOTE — H&P
"Preoperative History and Physical  Procedure:  D&E  DOS:  24  Location:   ED1    Rachel Rice is a 24 y.o.  who presents with heavy vaginal bleeding following a TAB. She had medical TAB on 8/15. She reports that she went to [place] for cramping on  and was told that she has retained POCS. She was given Misoprostol. She reports daily bleeding since then. Today bleeding became very heavy. She passed a lemon sized clot and soaked through 2 pads, pants and underwear as blood was \"pouring\" out of her. Also with bad cramping.     While in ED, blood pressure dropped to 70s/40s and she became pale, diaphoretic.    Past Medical History:   Diagnosis Date          History reviewed. No pertinent surgical history.  Past OB/Gyn History:  Denies any history of sexually transmitted infection.  No history of abnormal pap smears    History reviewed. No pertinent family history.  Social History:  Social History     Socioeconomic History    Marital status: Single     Spouse name: Not on file    Number of children: Not on file    Years of education: Not on file    Highest education level: Not on file   Occupational History    Not on file   Tobacco Use    Smoking status: Never    Smokeless tobacco: Never   Vaping Use    Vaping status: Not on file   Substance and Sexual Activity    Alcohol use: Not Currently     Comment: denies x 10 days    Drug use: Not Currently     Types: Methamphetamines     Comment: hx of meth use    Sexual activity: Not on file   Other Topics Concern    Not on file   Social History Narrative    Not on file     Social Determinants of Health     Financial Resource Strain: Not on file   Food Insecurity: Not on file   Transportation Needs: Not on file   Physical Activity: Not on file   Stress: Not on file   Social Connections: Not on file   Intimate Partner Violence: Not on file   Housing Stability: Not on file     No Known Allergies    Current Facility-Administered Medications:     " methylergonovine (METHERGINE) injection 0.2 mg, 0.2 mg, Intramuscular, Once, Tracy Yang MD    miSOPROStol (Cytotec) tablet 1,000 mcg, 1,000 mcg, Rectal, Once, Tracy Yang MD    morphine injection 4 mg, 4 mg, Intravenous, Once, Staci Yepez DO    ondansetron (ZOFRAN) injection 4 mg, 4 mg, Intravenous, Once, Staci Yepez DO    sodium chloride 0.9 % bolus 500 mL, 500 mL, Intravenous, Once, Staci Yepez DO, Last Rate: 500 mL/hr at 09/08/24 1050, 500 mL at 09/08/24 1050    tranexamic acid (CYKLOKAPRON) 1000-0.7 MG/100ML-% injection 1,000 mg, 1,000 mg, Intravenous, Once, Tracy Yang MD  No current outpatient medications on file.    Review of Systems:  A complete review of systems was performed and was negative, except as listed.    Physical Exam:  /55 (BP Location: Right arm)   Pulse 80   Temp 98 °F (36.7 °C) (Temporal)   Resp 15   LMP 06/15/2024   SpO2 100%   Physical Exam  Constitutional:       Appearance: Normal appearance.   Genitourinary:      Vulva normal.      Genitourinary Comments: Large amount of clots removed from vagina. Active bleeding noted with closed cervix. Estimated 500cc blood loss during exam. Uterus small and enteverted.   HENT:      Head: Normocephalic and atraumatic.   Pulmonary:      Effort: Pulmonary effort is normal.   Abdominal:      Palpations: Abdomen is soft. There is no mass.      Tenderness: There is no abdominal tenderness. There is no guarding or rebound.   Musculoskeletal:         General: Normal range of motion.   Neurological:      Mental Status: She is alert and oriented to person, place, and time.   Skin:     General: Skin is warm and dry.   Psychiatric:         Mood and Affect: Mood normal.         Behavior: Behavior normal.         Thought Content: Thought content normal.         Judgment: Judgment normal.       Lab Results   Component Value Date    WBC 14.51 (H) 09/08/2024    HGB 11.4 (L) 09/08/2024    HCT 34.5 (L) 09/08/2024    MCV 87 09/08/2024      2024        Assessment & Plan: Rachel Rice is a 24 y.o.  with hemorrhage following .      Transfuse pRBCs  Methergine, Cytotec  TXA  Consented for D&C  Prep for OR

## 2024-09-09 LAB
ABO GROUP BLD BPU: NORMAL
BPU ID: NORMAL
CROSSMATCH: NORMAL
UNIT DISPENSE STATUS: NORMAL
UNIT PRODUCT CODE: NORMAL
UNIT PRODUCT VOLUME: 350 ML
UNIT RH: NORMAL

## 2024-09-09 RX ORDER — IBUPROFEN 600 MG/1
600 TABLET, FILM COATED ORAL EVERY 6 HOURS PRN
Qty: 30 TABLET | Refills: 0 | Status: SHIPPED | OUTPATIENT
Start: 2024-09-09

## 2024-09-09 RX ADMIN — SODIUM CHLORIDE, SODIUM LACTATE, POTASSIUM CHLORIDE, AND CALCIUM CHLORIDE 100 ML/HR: .6; .31; .03; .02 INJECTION, SOLUTION INTRAVENOUS at 00:24

## 2024-09-09 NOTE — QUICK NOTE
"OBCRISELDA Ramos reports scant bleeding overnight. Cramping controlled with Toradol.     Physical Exam:  /53   Pulse 67   Temp (!) 97.4 °F (36.3 °C) (Temporal)   Resp 18   Ht 5' 5\" (1.651 m)   Wt 81.6 kg (180 lb)   LMP 06/15/2024   SpO2 97%   BMI 29.95 kg/m²     Lab Results   Component Value Date    WBC 21.22 (H) 2024    HGB 10.7 (L) 2024    HCT 32.4 (L) 2024    MCV 88 2024     2024       Physical Exam  Vitals and nursing note reviewed.   Constitutional:       General: She is not in acute distress.     Appearance: She is well-developed.   HENT:      Head: Normocephalic and atraumatic.   Eyes:      Conjunctiva/sclera: Conjunctivae normal.   Pulmonary:      Effort: Pulmonary effort is normal. No respiratory distress.   Abdominal:      Palpations: Abdomen is soft.      Tenderness: There is no abdominal tenderness.   Musculoskeletal:         General: No swelling.      Cervical back: Neck supple.   Skin:     General: Skin is warm and dry.      Capillary Refill: Capillary refill takes less than 2 seconds.   Neurological:      Mental Status: She is alert.   Psychiatric:         Mood and Affect: Mood normal.        Assessment & Plan: Rachel Rice is a 24 y.o.  POD #1 s/p D&E for  with hemorrhage. Stable for discharge. F/u with Gina ELIAS.  "

## 2024-09-09 NOTE — PLAN OF CARE
Problem: PAIN - ADULT  Goal: Verbalizes/displays adequate comfort level or baseline comfort level  Description: Interventions:  - Encourage patient to monitor pain and request assistance  - Assess pain using appropriate pain scale  - Administer analgesics based on type and severity of pain and evaluate response  - Implement non-pharmacological measures as appropriate and evaluate response  - Consider cultural and social influences on pain and pain management  - Notify physician/advanced practitioner if interventions unsuccessful or patient reports new pain  Outcome: Progressing     Problem: INFECTION - ADULT  Goal: Absence or prevention of progression during hospitalization  Description: INTERVENTIONS:  - Assess and monitor for signs and symptoms of infection  - Monitor lab/diagnostic results  - Monitor all insertion sites, i.e. indwelling lines, tubes, and drains  - Monitor endotracheal if appropriate and nasal secretions for changes in amount and color  - Fleming appropriate cooling/warming therapies per order  - Administer medications as ordered  - Instruct and encourage patient and family to use good hand hygiene technique  - Identify and instruct in appropriate isolation precautions for identified infection/condition  Outcome: Progressing  Goal: Absence of fever/infection during neutropenic period  Description: INTERVENTIONS:  - Monitor WBC    Outcome: Progressing     Problem: DISCHARGE PLANNING  Goal: Discharge to home or other facility with appropriate resources  Description: INTERVENTIONS:  - Identify barriers to discharge w/patient and caregiver  - Arrange for needed discharge resources and transportation as appropriate  - Identify discharge learning needs (meds, wound care, etc.)  - Arrange for interpretive services to assist at discharge as needed  - Refer to Case Management Department for coordinating discharge planning if the patient needs post-hospital services based on physician/advanced  practitioner order or complex needs related to functional status, cognitive ability, or social support system  Outcome: Progressing

## 2024-09-11 LAB
ABO GROUP BLD BPU: NORMAL
ABO GROUP BLD BPU: NORMAL
BPU ID: NORMAL
BPU ID: NORMAL
CROSSMATCH: NORMAL
CROSSMATCH: NORMAL
UNIT DISPENSE STATUS: NORMAL
UNIT DISPENSE STATUS: NORMAL
UNIT PRODUCT CODE: NORMAL
UNIT PRODUCT CODE: NORMAL
UNIT PRODUCT VOLUME: 350 ML
UNIT PRODUCT VOLUME: 350 ML
UNIT RH: NORMAL
UNIT RH: NORMAL

## 2024-09-11 PROCEDURE — 88305 TISSUE EXAM BY PATHOLOGIST: CPT | Performed by: PATHOLOGY

## (undated) DEVICE — GLOVE INDICATOR PI UNDERGLOVE SZ 7 BLUE

## (undated) DEVICE — D + E SUCTION CANISTER

## (undated) DEVICE — D + E SAFE TOUCH TISSUE TRAP (CIRCON)

## (undated) DEVICE — STRL ALLENTOWN HYSTEROSCOPY PK: Brand: CARDINAL HEALTH

## (undated) DEVICE — GLOVE SRG LF STRL BGL SKNSNS 6.5 PF

## (undated) DEVICE — COLLECTION SET, DISPOSABLE WITH HANDLE AND TAPERED FITTINGS TUBING, 6 FT (183 CM): Brand: GYRUS ACMI

## (undated) DEVICE — ARTHROSCOPY FLOOR MAT

## (undated) DEVICE — PREMIUM DRY TRAY LF: Brand: MEDLINE INDUSTRIES, INC.

## (undated) DEVICE — PAD SANITARY

## (undated) DEVICE — CURETTE VACURETTE CRVD 10MM

## (undated) DEVICE — NEEDLE SPINAL 25G X 3.5 IN QUINCKE

## (undated) DEVICE — SYRINGE 10ML LL CONTROL TOP